# Patient Record
Sex: MALE | Race: ASIAN | NOT HISPANIC OR LATINO | URBAN - METROPOLITAN AREA
[De-identification: names, ages, dates, MRNs, and addresses within clinical notes are randomized per-mention and may not be internally consistent; named-entity substitution may affect disease eponyms.]

---

## 2023-04-18 ENCOUNTER — INPATIENT (INPATIENT)
Facility: HOSPITAL | Age: 63
LOS: 1 days | Discharge: HOME | DRG: 101 | End: 2023-04-20
Attending: PSYCHIATRY & NEUROLOGY | Admitting: PSYCHIATRY & NEUROLOGY
Payer: MEDICARE

## 2023-04-18 VITALS
SYSTOLIC BLOOD PRESSURE: 124 MMHG | RESPIRATION RATE: 18 BRPM | OXYGEN SATURATION: 98 % | DIASTOLIC BLOOD PRESSURE: 79 MMHG | TEMPERATURE: 98 F | HEART RATE: 87 BPM

## 2023-04-18 DIAGNOSIS — R41.82 ALTERED MENTAL STATUS, UNSPECIFIED: ICD-10-CM

## 2023-04-18 LAB
ALBUMIN SERPL ELPH-MCNC: 4.6 G/DL — SIGNIFICANT CHANGE UP (ref 3.3–5)
ALP SERPL-CCNC: 85 U/L — SIGNIFICANT CHANGE UP (ref 40–120)
ALT FLD-CCNC: 25 U/L — SIGNIFICANT CHANGE UP (ref 10–45)
ANION GAP SERPL CALC-SCNC: 13 MMOL/L — SIGNIFICANT CHANGE UP (ref 5–17)
AST SERPL-CCNC: 37 U/L — SIGNIFICANT CHANGE UP (ref 10–40)
BASOPHILS # BLD AUTO: 0.02 K/UL — SIGNIFICANT CHANGE UP (ref 0–0.2)
BASOPHILS NFR BLD AUTO: 0.3 % — SIGNIFICANT CHANGE UP (ref 0–2)
BILIRUB SERPL-MCNC: 0.4 MG/DL — SIGNIFICANT CHANGE UP (ref 0.2–1.2)
BUN SERPL-MCNC: 10 MG/DL — SIGNIFICANT CHANGE UP (ref 7–23)
CALCIUM SERPL-MCNC: 9.5 MG/DL — SIGNIFICANT CHANGE UP (ref 8.4–10.5)
CHLORIDE SERPL-SCNC: 102 MMOL/L — SIGNIFICANT CHANGE UP (ref 96–108)
CK SERPL-CCNC: 173 U/L — SIGNIFICANT CHANGE UP (ref 30–200)
CO2 SERPL-SCNC: 24 MMOL/L — SIGNIFICANT CHANGE UP (ref 22–31)
CREAT SERPL-MCNC: 0.88 MG/DL — SIGNIFICANT CHANGE UP (ref 0.5–1.3)
EGFR: 97 ML/MIN/1.73M2 — SIGNIFICANT CHANGE UP
EOSINOPHIL # BLD AUTO: 0.11 K/UL — SIGNIFICANT CHANGE UP (ref 0–0.5)
EOSINOPHIL NFR BLD AUTO: 1.7 % — SIGNIFICANT CHANGE UP (ref 0–6)
GLUCOSE SERPL-MCNC: 95 MG/DL — SIGNIFICANT CHANGE UP (ref 70–99)
HCT VFR BLD CALC: 40.6 % — SIGNIFICANT CHANGE UP (ref 39–50)
HGB BLD-MCNC: 14 G/DL — SIGNIFICANT CHANGE UP (ref 13–17)
IMM GRANULOCYTES NFR BLD AUTO: 0.5 % — SIGNIFICANT CHANGE UP (ref 0–0.9)
LACTATE SERPL-SCNC: 1 MMOL/L — SIGNIFICANT CHANGE UP (ref 0.5–2)
LYMPHOCYTES # BLD AUTO: 1.71 K/UL — SIGNIFICANT CHANGE UP (ref 1–3.3)
LYMPHOCYTES # BLD AUTO: 27.1 % — SIGNIFICANT CHANGE UP (ref 13–44)
MCHC RBC-ENTMCNC: 30 PG — SIGNIFICANT CHANGE UP (ref 27–34)
MCHC RBC-ENTMCNC: 34.5 GM/DL — SIGNIFICANT CHANGE UP (ref 32–36)
MCV RBC AUTO: 87.1 FL — SIGNIFICANT CHANGE UP (ref 80–100)
MONOCYTES # BLD AUTO: 0.4 K/UL — SIGNIFICANT CHANGE UP (ref 0–0.9)
MONOCYTES NFR BLD AUTO: 6.3 % — SIGNIFICANT CHANGE UP (ref 2–14)
NEUTROPHILS # BLD AUTO: 4.05 K/UL — SIGNIFICANT CHANGE UP (ref 1.8–7.4)
NEUTROPHILS NFR BLD AUTO: 64.1 % — SIGNIFICANT CHANGE UP (ref 43–77)
NRBC # BLD: 0 /100 WBCS — SIGNIFICANT CHANGE UP (ref 0–0)
PLATELET # BLD AUTO: 372 K/UL — SIGNIFICANT CHANGE UP (ref 150–400)
POTASSIUM SERPL-MCNC: 4.9 MMOL/L — SIGNIFICANT CHANGE UP (ref 3.5–5.3)
POTASSIUM SERPL-SCNC: 4.9 MMOL/L — SIGNIFICANT CHANGE UP (ref 3.5–5.3)
PROLACTIN SERPL-MCNC: 8.1 NG/ML — SIGNIFICANT CHANGE UP (ref 4.1–18.4)
PROT SERPL-MCNC: 7.2 G/DL — SIGNIFICANT CHANGE UP (ref 6–8.3)
RBC # BLD: 4.66 M/UL — SIGNIFICANT CHANGE UP (ref 4.2–5.8)
RBC # FLD: 13.5 % — SIGNIFICANT CHANGE UP (ref 10.3–14.5)
SODIUM SERPL-SCNC: 139 MMOL/L — SIGNIFICANT CHANGE UP (ref 135–145)
WBC # BLD: 6.32 K/UL — SIGNIFICANT CHANGE UP (ref 3.8–10.5)
WBC # FLD AUTO: 6.32 K/UL — SIGNIFICANT CHANGE UP (ref 3.8–10.5)

## 2023-04-18 PROCEDURE — 99285 EMERGENCY DEPT VISIT HI MDM: CPT | Mod: FS

## 2023-04-18 PROCEDURE — 99223 1ST HOSP IP/OBS HIGH 75: CPT

## 2023-04-18 PROCEDURE — 95720 EEG PHY/QHP EA INCR W/VEEG: CPT

## 2023-04-18 PROCEDURE — G1004: CPT

## 2023-04-18 PROCEDURE — 70450 CT HEAD/BRAIN W/O DYE: CPT | Mod: 26,MG

## 2023-04-18 RX ORDER — MEMANTINE HYDROCHLORIDE 10 MG/1
1 TABLET ORAL
Refills: 0 | DISCHARGE

## 2023-04-18 RX ORDER — DONEPEZIL HYDROCHLORIDE 10 MG/1
10 TABLET, FILM COATED ORAL AT BEDTIME
Refills: 0 | Status: DISCONTINUED | OUTPATIENT
Start: 2023-04-18 | End: 2023-04-20

## 2023-04-18 RX ORDER — ENOXAPARIN SODIUM 100 MG/ML
40 INJECTION SUBCUTANEOUS EVERY 24 HOURS
Refills: 0 | Status: DISCONTINUED | OUTPATIENT
Start: 2023-04-18 | End: 2023-04-20

## 2023-04-18 RX ORDER — MEMANTINE HYDROCHLORIDE 10 MG/1
28 TABLET ORAL DAILY
Refills: 0 | Status: DISCONTINUED | OUTPATIENT
Start: 2023-04-18 | End: 2023-04-18

## 2023-04-18 RX ORDER — MEMANTINE HYDROCHLORIDE 10 MG/1
28 TABLET ORAL DAILY
Refills: 0 | Status: DISCONTINUED | OUTPATIENT
Start: 2023-04-18 | End: 2023-04-20

## 2023-04-18 RX ADMIN — ENOXAPARIN SODIUM 40 MILLIGRAM(S): 100 INJECTION SUBCUTANEOUS at 14:46

## 2023-04-18 RX ADMIN — DONEPEZIL HYDROCHLORIDE 10 MILLIGRAM(S): 10 TABLET, FILM COATED ORAL at 21:29

## 2023-04-18 RX ADMIN — MEMANTINE HYDROCHLORIDE 28 MILLIGRAM(S): 10 TABLET ORAL at 19:02

## 2023-04-18 NOTE — H&P ADULT - HISTORY OF PRESENT ILLNESS
MRN-[id number]  Patient is a 63y old  Male who presents with a chief complaint of   HPI:  Patient is a 62yo RT handed Rainer Speaking M with pmh of Dementia ( dx 2021, Follows Dr. Caren Garcia) and HLD presents to [facility] for episode of AMS. Patient's family member at bedside and provided history and video of the episode. Per family, this is the first time this has occurred. Patient was sitting on a high chair eating breakfast when the event occurred. Patient fell backwards and had a headstrike. Patient had CT head w/o contrast on admission which was negative for any acute abnormalities. Per video shown by family, patient had loud ictal cry with raising of his left arm followed by following backwards with Rt head turn with forced Rt gaze. Patient was noted to have foaming at the mouth. This event lasted for about 5 minutes followed by long period of postictal confusion. Family states at baseline, patient is AAOX3 and is currently AA0x2. Patient denies urinary and fecal incontinence.     Semiology   1. Ictal Cry with Left arm raise. Then  rt gaze deviation with rt head turn.  Followed by postictal confusion.     PAST MEDICAL & SURGICAL HISTORY:  Dementia      HLD (hyperlipidemia)        FAMILY HISTORY:    Social Hx:  Nonsmoker, no drug or alcohol use    Home Medications:    MEDICATIONS  (STANDING):    MEDICATIONS  (PRN):    Allergies  No Known Allergies    Intolerances      REVIEW OF SYSTEMS  General: Denies fever and chills 	  Ophthalmologic: Denies blurred vision   Respiratory and Thorax: Denies SOB	  Cardiovascular:	Denies CP  : Denies urinary incontinence and fecal incontinence   Gastrointestinal:	 Denies N, V  Musculoskeletal: Denies muscle pain   Neurological:	Mentions headaches  :	    ROS: Pertinent positives in HPI, all other ROS were reviewed and are negative.         MRN-[id number]  Patient is a 63y old  Male who presents with a chief complaint of   HPI:  Patient is a 62yo RT handed Rainer Speaking M with pmh of Early onset Alzheimer's Dementia ( dx 2021, Follows Dr. Caren Garcia) and HLD presents to [facility] for episode of AMS. Patient's family member at bedside and provided history and video of the episode. Per family, this is the first time this has occurred. Patient was sitting on a high chair eating breakfast when the event occurred. Patient fell backwards and had a headstrike. Patient had CT head w/o contrast on admission which was negative for any acute abnormalities. Per video shown by family, patient had loud ictal cry with raising of his left arm followed by following backwards with Rt head turn with forced Rt gaze. Patient was noted to have foaming at the mouth. This event lasted for about 5 minutes followed by long period of postictal confusion. Family states at baseline, patient is AAOX3 and is currently AA0x2. Patient denies urinary and fecal incontinence.   Spoke to his Neurologist, Dr. Garcia whom confirmed patient does not have a history of epilepsy and diagnosed with Early onset Alzheimer's Dementia confirmed with MRI and PET scan.  Patient takes Memantine 28mg QDay ER and Donzepil 10mg Qday,     Semiology   1. Ictal Cry with Left arm raise. Then  rt gaze deviation with rt head turn.  Followed by postictal confusion.     PAST MEDICAL & SURGICAL HISTORY:  Dementia      HLD (hyperlipidemia)        FAMILY HISTORY:    Social Hx:  Nonsmoker, no drug or alcohol use    Home Medications:    MEDICATIONS  (STANDING):    MEDICATIONS  (PRN):    Allergies  No Known Allergies    Intolerances      REVIEW OF SYSTEMS  General: Denies fever and chills 	  Ophthalmologic: Denies blurred vision   Respiratory and Thorax: Denies SOB	  Cardiovascular:	Denies CP  : Denies urinary incontinence and fecal incontinence   Gastrointestinal:	 Denies N, V  Musculoskeletal: Denies muscle pain   Neurological:	Mentions headaches  :	    ROS: Pertinent positives in HPI, all other ROS were reviewed and are negative.

## 2023-04-18 NOTE — ED PROVIDER NOTE - OBJECTIVE STATEMENT
64 yo male with PMHx of unspecified dementia (on memantine and donepezil) was brought in by family for witnessed episode of "frothing" at the mouth and confusion after falling from a high chair earlier in the morning. Pt's wife recorded the episode, stated that he was drinking something then gasped and slipped off the chair. The wife tried to ease him down but was unable to hold him and he had head strike. In the video, the pt was spewing liquid from mouth and was notably confused afterwards for 10-15mins. Currently A&Ox3 (self, hospital, year). Per family, denied hx of seizures or similar behaviors in the past. Denied fevers, chills.

## 2023-04-18 NOTE — ED PROVIDER NOTE - PHYSICAL EXAMINATION
CONSTITUTIONAL: Patient is awake, alert and oriented x 3. In no acute distress.  HEAD: NC/AT  EYES: PERRL b/l, EOMI, sclera non-icteric  NECK: supple, no LAD  LUNGS: CTAB, no increased WOB, no wheezing/rales appreciated  HEART: RRR.+S1S2, no murmurs appreciated  ABDOMEN: Soft, non-distended, non-tender, +BS, no rebound or guarding.   EXTREMITY: WWP, no edema or calf tenderness b/l, full ROM upper and lower ext b/l  SKIN: No rash or lesions appreciated  NEURO: Cn3-12 grossly intact. Strength 5/5 UE/LE. normal sensation

## 2023-04-18 NOTE — ED ADULT TRIAGE NOTE - CHIEF COMPLAINT QUOTE
Per daughter Pt fell this am was shaking with foam from mouth HX Dementia  Denies incontinence of urine Per daughter "almost back to normal mental self."  Hit head Poss LOC   Ambulate fine without walker per daughter

## 2023-04-18 NOTE — H&P ADULT - NSHPPHYSICALEXAM_GEN_ALL_CORE
Vital Signs Last 24 Hrs  T(C): 36.8 (18 Apr 2023 09:52), Max: 36.8 (18 Apr 2023 09:52)  T(F): 98.3 (18 Apr 2023 09:52), Max: 98.3 (18 Apr 2023 09:52)  HR: 85 (18 Apr 2023 10:29) (85 - 87)  BP: 130/83 (18 Apr 2023 10:29) (124/79 - 130/83)  BP(mean): --  RR: 18 (18 Apr 2023 10:29) (18 - 18)  SpO2: 100% (18 Apr 2023 10:29) (98% - 100%)    Parameters below as of 18 Apr 2023 10:29  Patient On (Oxygen Delivery Method): room air        GENERAL EXAM:  Constitutional: awake and alert. NAD  HEENT: PERRL, EOMI  Neck: Supple  Musculoskeletal: no joint swelling/tenderness, no abnormal movements  Skin: no rashes    NEUROLOGICAL EXAM:  MS: AAOX2 to person an place. Speech is fluent, Follows simple commands.   CN: VFF, EOMI, PERRL,    V1-3 intact, no facial asymmetry, t/p midline, SCM/trap intact.  Motor: Strength: 5/5 4x. Tone: normal. Bulk: normal.    Plantar flex b/l.   Sensation: intact 4x.   Coordination: FTN b/l.   Gait:  Romberg negative, pull test negative; walks with narrow base, pivots in 2 steps.

## 2023-04-18 NOTE — H&P ADULT - ASSESSMENT
Patient is a 62yo RT handed Rainer Speaking M with pmh of Dementia ( dx 2021, Follows Dr. Caren Garcia) and HLD presents to [facility] for episode of AMS.  Patient was sitting on a high chair eating breakfast when the event occurred. Patient fell backwards and had a headstrike. Patient had CT head w/o contrast on admission which was negative for any acute abnormalities.    Semiology:   Semiology   1. Ictal Cry with Left arm raise. Then  rt gaze deviation with rt head turn.  Followed by postictal confusion.     Recommendations;   Neurology:  EMU admission   vEEG  Continue on home Neurology medications   hold of on AEDS for now   Rescue: Ativan 1mg for GTCs with VS changes lasting longer than 3 minutes, If refractory then Vimpat 100mg X1 IV   Seizure precautions     Pulm   on RA     Cardiology   Keep normotensive   Telemetry   Baseline EKG     GI   bedside dysphagia screen   BM regiment     Heme  monitor CBC   DVT ppx     Renal   monitor BMP  Correction of electrolytes as needed   NS 50 cc/hr until tolerating diet     Endo   no acute issues     ID   Afebrile  U/A     Case discussed with Epilepsy Attending, Dr. Levi.  Patient is a 64yo RT handed Rainer Speaking M with pmh of Dementia ( dx 2021, Follows Dr. Caren Garcia) and HLD presents to [facility] for episode of AMS.  Patient was sitting on a high chair eating breakfast when the event occurred. Patient fell backwards and had a headstrike. Patient had CT head w/o contrast on admission which was negative for any acute abnormalities.    Semiology:   Semiology   1. Ictal Cry with Left arm raise. Then  rt gaze deviation with rt head turn.  Followed by postictal confusion.     Recommendations;   Neurology:  EMU admission   vEEG  Continue on Memantine 28mg Qday ER   Continue on Donepezil 10mg Qday    hold of on AEDS for now   Rescue: Ativan 1mg for GTCs with VS changes lasting longer than 3 minutes, If refractory then Vimpat 100mg X1 IV   Seizure precautions     Pulm   on RA     Cardiology   Keep normotensive   Telemetry   Baseline EKG     GI   bedside dysphagia screen   BM regiment     Heme  monitor CBC   DVT ppx     Renal   monitor BMP  Correction of electrolytes as needed   NS 50 cc/hr until tolerating diet     Endo   no acute issues     ID   Afebrile  U/A     Case discussed with Epilepsy Attending, Dr. Levi.

## 2023-04-18 NOTE — ED PROVIDER NOTE - CLINICAL SUMMARY MEDICAL DECISION MAKING FREE TEXT BOX
64 yo male with PMHx of unspecified dementia (on memantine and donepezil) was brought in by family for witnessed episode of "frothing" at the mouth and confusion after falling from a high chair earlier in the morning. Pt's wife recorded the episode, stated that he was drinking something then gasped and slipped off the chair. The wife tried to ease him down but was unable to hold him and he had head strike. In the video, the pt was spewing liquid from mouth and was notably confused afterwards for 10-15mins. Vitals within normal limits, exam relatively unremarkable - A&Ox3 - per family back to baseline, CN3-12 grossly intact, no weakness appreciated. Strange presentation, pt initially appeared to be choking on liquids - therefore was spewing fluids out. No muscular twitching appreciated on the video. Per family, pt was confused for a little and then returned to baseline. Has never had this happen before. Presentation c/f possible seizures with post-ictal confusion - although no prior hx of seizures. Will get common labs and NCHCT to r/o electrolyte mediated seizures vs possibly drug induced vs malignancy. Will consult neuro for further evaluation. 62 yo male with PMHx of unspecified dementia (on memantine and donepezil) was brought in by family for witnessed episode of "frothing" at the mouth and confusion after falling from a high chair earlier in the morning. Pt's wife recorded the episode, stated that he was drinking something then gasped and slipped off the chair. The wife tried to ease him down but was unable to hold him and he had head strike. In the video, the pt was spewing liquid from mouth and was notably confused afterwards for 10-15mins. Vitals within normal limits, exam relatively unremarkable - A&Ox3 - per family back to baseline, CN3-12 grossly intact, no weakness appreciated. Strange presentation, pt initially appeared to be choking on liquids - therefore was spewing fluids out. No muscular twitching appreciated on the video. Per family, pt was confused for a little and then returned to baseline. Has never had this happen before. Presentation c/f possible seizures with post-ictal confusion - although no prior hx of seizures. Will get common labs and NCHCT to r/o electrolyte mediated seizures vs possibly drug induced vs malignancy. Will consult neuro for further evaluation.ZR

## 2023-04-18 NOTE — H&P ADULT - NSHPLABSRESULTS_GEN_ALL_CORE
Labs:   cbc                      14.0   6.32  )-----------( 372      ( 18 Apr 2023 10:44 )             40.6     Chem      Radiology:  CT head w/o contrast: IMPRESSION: No acute hemorrhage mass or mass effect.

## 2023-04-18 NOTE — ED PROVIDER NOTE - PROGRESS NOTE DETAILS
Neuro consulted, will see pt.   - Bethel Caro PA-C Neuro recs for admission to EMU. Pt and family was made aware.   - Bethel Caro PA-C

## 2023-04-18 NOTE — ED ADULT NURSE NOTE - OBJECTIVE STATEMENT
63M awake and alert oriented to self and family members only, knows he is in the hospital but doesn't know which one, came from home accomanied by wife with c/o seizure like activity while drinking his milk and wife ease the patient in the floor and took video of what's happening with the patient. iN THE VIDEO PATIENT LOOKS LIKE HAVING A GENERALIZED SEIZURE ACTIVITY WITH MILK COMING OUT OF THE MOUTH, NOT SURE IF PATIENT ASPIRATED. wIFE DENIES THAT PATIENT FELL DIRECTLY IN THE FLOOR. No signs of injury, patient at his baseline as per wife. VS WDL. Patient with h/o dementia.   Left AC 18g IV access inserted, labs drawn pending order from MD.

## 2023-04-19 ENCOUNTER — TRANSCRIPTION ENCOUNTER (OUTPATIENT)
Age: 63
End: 2023-04-19

## 2023-04-19 LAB
ALBUMIN SERPL ELPH-MCNC: 4.1 G/DL — SIGNIFICANT CHANGE UP (ref 3.3–5)
ALP SERPL-CCNC: 92 U/L — SIGNIFICANT CHANGE UP (ref 40–120)
ALT FLD-CCNC: 21 U/L — SIGNIFICANT CHANGE UP (ref 10–45)
ANION GAP SERPL CALC-SCNC: 12 MMOL/L — SIGNIFICANT CHANGE UP (ref 5–17)
AST SERPL-CCNC: 29 U/L — SIGNIFICANT CHANGE UP (ref 10–40)
BASOPHILS # BLD AUTO: 0.05 K/UL — SIGNIFICANT CHANGE UP (ref 0–0.2)
BASOPHILS NFR BLD AUTO: 0.7 % — SIGNIFICANT CHANGE UP (ref 0–2)
BILIRUB SERPL-MCNC: 0.3 MG/DL — SIGNIFICANT CHANGE UP (ref 0.2–1.2)
BUN SERPL-MCNC: 8 MG/DL — SIGNIFICANT CHANGE UP (ref 7–23)
CALCIUM SERPL-MCNC: 9.2 MG/DL — SIGNIFICANT CHANGE UP (ref 8.4–10.5)
CHLORIDE SERPL-SCNC: 104 MMOL/L — SIGNIFICANT CHANGE UP (ref 96–108)
CO2 SERPL-SCNC: 22 MMOL/L — SIGNIFICANT CHANGE UP (ref 22–31)
CREAT SERPL-MCNC: 0.81 MG/DL — SIGNIFICANT CHANGE UP (ref 0.5–1.3)
EGFR: 99 ML/MIN/1.73M2 — SIGNIFICANT CHANGE UP
EOSINOPHIL # BLD AUTO: 0.24 K/UL — SIGNIFICANT CHANGE UP (ref 0–0.5)
EOSINOPHIL NFR BLD AUTO: 3.3 % — SIGNIFICANT CHANGE UP (ref 0–6)
GLUCOSE SERPL-MCNC: 92 MG/DL — SIGNIFICANT CHANGE UP (ref 70–99)
HCT VFR BLD CALC: 39 % — SIGNIFICANT CHANGE UP (ref 39–50)
HCV AB S/CO SERPL IA: 0.58 S/CO — SIGNIFICANT CHANGE UP (ref 0–0.99)
HCV AB SERPL-IMP: SIGNIFICANT CHANGE UP
HGB BLD-MCNC: 13.3 G/DL — SIGNIFICANT CHANGE UP (ref 13–17)
IMM GRANULOCYTES NFR BLD AUTO: 0.4 % — SIGNIFICANT CHANGE UP (ref 0–0.9)
LYMPHOCYTES # BLD AUTO: 2.76 K/UL — SIGNIFICANT CHANGE UP (ref 1–3.3)
LYMPHOCYTES # BLD AUTO: 37.8 % — SIGNIFICANT CHANGE UP (ref 13–44)
MCHC RBC-ENTMCNC: 29.8 PG — SIGNIFICANT CHANGE UP (ref 27–34)
MCHC RBC-ENTMCNC: 34.1 GM/DL — SIGNIFICANT CHANGE UP (ref 32–36)
MCV RBC AUTO: 87.4 FL — SIGNIFICANT CHANGE UP (ref 80–100)
MONOCYTES # BLD AUTO: 0.62 K/UL — SIGNIFICANT CHANGE UP (ref 0–0.9)
MONOCYTES NFR BLD AUTO: 8.5 % — SIGNIFICANT CHANGE UP (ref 2–14)
NEUTROPHILS # BLD AUTO: 3.61 K/UL — SIGNIFICANT CHANGE UP (ref 1.8–7.4)
NEUTROPHILS NFR BLD AUTO: 49.3 % — SIGNIFICANT CHANGE UP (ref 43–77)
NRBC # BLD: 0 /100 WBCS — SIGNIFICANT CHANGE UP (ref 0–0)
PLATELET # BLD AUTO: 340 K/UL — SIGNIFICANT CHANGE UP (ref 150–400)
POTASSIUM SERPL-MCNC: 4 MMOL/L — SIGNIFICANT CHANGE UP (ref 3.5–5.3)
POTASSIUM SERPL-SCNC: 4 MMOL/L — SIGNIFICANT CHANGE UP (ref 3.5–5.3)
PROT SERPL-MCNC: 6.5 G/DL — SIGNIFICANT CHANGE UP (ref 6–8.3)
RBC # BLD: 4.46 M/UL — SIGNIFICANT CHANGE UP (ref 4.2–5.8)
RBC # FLD: 13.3 % — SIGNIFICANT CHANGE UP (ref 10.3–14.5)
SODIUM SERPL-SCNC: 138 MMOL/L — SIGNIFICANT CHANGE UP (ref 135–145)
WBC # BLD: 7.31 K/UL — SIGNIFICANT CHANGE UP (ref 3.8–10.5)
WBC # FLD AUTO: 7.31 K/UL — SIGNIFICANT CHANGE UP (ref 3.8–10.5)

## 2023-04-19 PROCEDURE — 95720 EEG PHY/QHP EA INCR W/VEEG: CPT

## 2023-04-19 PROCEDURE — 99232 SBSQ HOSP IP/OBS MODERATE 35: CPT

## 2023-04-19 RX ORDER — DONEPEZIL HYDROCHLORIDE 10 MG/1
1 TABLET, FILM COATED ORAL
Qty: 0 | Refills: 0 | DISCHARGE
Start: 2023-04-19

## 2023-04-19 RX ORDER — LEVETIRACETAM 250 MG/1
1 TABLET, FILM COATED ORAL
Qty: 60 | Refills: 0
Start: 2023-04-19 | End: 2023-05-18

## 2023-04-19 RX ORDER — DONEPEZIL HYDROCHLORIDE 10 MG/1
1 TABLET, FILM COATED ORAL
Refills: 0 | DISCHARGE

## 2023-04-19 RX ORDER — ZONISAMIDE 100 MG
100 CAPSULE ORAL AT BEDTIME
Refills: 0 | Status: DISCONTINUED | OUTPATIENT
Start: 2023-04-19 | End: 2023-04-19

## 2023-04-19 RX ORDER — LEVETIRACETAM 250 MG/1
500 TABLET, FILM COATED ORAL
Refills: 0 | Status: DISCONTINUED | OUTPATIENT
Start: 2023-04-19 | End: 2023-04-20

## 2023-04-19 RX ORDER — LEVETIRACETAM 250 MG/1
1000 TABLET, FILM COATED ORAL ONCE
Refills: 0 | Status: COMPLETED | OUTPATIENT
Start: 2023-04-19 | End: 2023-04-19

## 2023-04-19 RX ADMIN — DONEPEZIL HYDROCHLORIDE 10 MILLIGRAM(S): 10 TABLET, FILM COATED ORAL at 21:27

## 2023-04-19 RX ADMIN — ENOXAPARIN SODIUM 40 MILLIGRAM(S): 100 INJECTION SUBCUTANEOUS at 11:58

## 2023-04-19 RX ADMIN — MEMANTINE HYDROCHLORIDE 28 MILLIGRAM(S): 10 TABLET ORAL at 11:59

## 2023-04-19 RX ADMIN — LEVETIRACETAM 400 MILLIGRAM(S): 250 TABLET, FILM COATED ORAL at 10:20

## 2023-04-19 RX ADMIN — LEVETIRACETAM 500 MILLIGRAM(S): 250 TABLET, FILM COATED ORAL at 17:05

## 2023-04-19 NOTE — DISCHARGE NOTE PROVIDER - NSDCCPCAREPLAN_GEN_ALL_CORE_FT
PRINCIPAL DISCHARGE DIAGNOSIS  Diagnosis: Seizure disorder  Assessment and Plan of Treatment: You were monitored on vEEG and are at high risk for seizures. You were started on keppra 500mg two times a day. Please take your medications as prescribed. Please follow up with your neurologist. Please return to the ED for any seizure like activity.     PRINCIPAL DISCHARGE DIAGNOSIS  Diagnosis: Seizure disorder  Assessment and Plan of Treatment: You were monitored on vEEG and are at high risk for seizures. You were started on keppra 500mg two times a day. Please take your medications as prescribed. Please follow up with your neurologist. Please return to the ED for any seizure like activity.  Seizure Safety Instructions  1. [facility] law mandates you to self-report your seizure disorder to [facility], and be seizure-free for 1yr before you can drive.   2. Avoid swimming, diving, taking a bath, cooking, use of motarized machineries.  3. Avoid climbing a ladder, trees or any height.  4. Use machines with safety switches.  5. Always be aware of your surroundings and make sure family and friends are aware of your seizures.  6. Use non-breakable dishes.  7. Consider wearing a medical bracelet to inform people you have epilepsy in case of an emergency.  To get better and follow your care plan as instructed.

## 2023-04-19 NOTE — PROGRESS NOTE ADULT - ASSESSMENT
Patient is a 64yo RT handed Rainer Speaking M with pmh of Dementia ( dx 2021, Follows Dr. Caren Garcia) and HLD presents to [facility] for episode of AMS.  Patient was sitting on a high chair eating breakfast when the event occurred. Patient fell backwards and had a headstrike. Patient had CT head w/o contrast on admission which was negative for any acute abnormalities.    Semiology   1. Ictal Cry with Left arm raise. Then  rt gaze deviation with rt head turn.  Followed by postictal confusion.     PLAN:   vEEG  Continue on Memantine 28mg Qday ER   Continue on Donepezil 10mg Qday    hold of on AEDS for now   Rescue: Ativan 1mg for GTCs with VS changes lasting longer than 3 minutes, If refractory then Vimpat 100mg X1 IV   Seizure precautions   Telemetry   DVT ppx - lovenox subq   diet: regular     Case discussed with Epilepsy Attending, Dr. Levi.  Patient is a 62yo RT handed Rainer Speaking M with pmh of Dementia ( dx 2021, Follows Dr. Caren Garcia) and HLD presents to [facility] for episode of AMS.  Patient was sitting on a high chair eating breakfast when the event occurred. Patient fell backwards and had a headstrike. Patient had CT head w/o contrast on admission which was negative for any acute abnormalities.    Semiology   1. Ictal Cry with Left arm raise. Then  rt gaze deviation with rt head turn.  Followed by postictal confusion.     PLAN:   - vEEG  - load with keppra 1g IVPB on 4/19 and start maintenance keppra 500mg BID  - Continue on Memantine 28mg Qday ER   - Continue on Donepezil 10mg Qday    - Plan of care discussed with lisa at bedside, patient, and patient's wife   Rescue: Ativan 1mg for GTCs with VS changes lasting longer than 3 minutes, If refractory then Vimpat 100mg X1 IV   Seizure precautions   Telemetry   DVT ppx - lovenox subq   diet: regular     Case discussed with Epilepsy Attending, Dr. Levi.

## 2023-04-19 NOTE — DISCHARGE NOTE PROVIDER - NSDCMRMEDTOKEN_GEN_ALL_CORE_FT
donepezil 10 mg oral tablet: 1 tab(s) orally once a day (at bedtime)  levETIRAcetam 500 mg oral tablet: 1 tab(s) orally 2 times a day MDD: 1000mg  memantine 28 mg oral capsule, extended release: 1 orally once a day

## 2023-04-19 NOTE — EEG REPORT - NS EEG TEXT BOX
Day 1 – 	Start: 4/18/2023 5:41 PM     	End: 4/19/2023 08:00 AM   	Duration:  14 hr 19 min     Daily EEG Visual Analysis    FINDINGS:  The background was continuous, symmetric, spontaneously variable and reactive. During wakefulness, the posterior dominant rhythm consisted of symmetric, 7-8 Hz activity, with amplitude to 30 uV, that attenuated to eye opening.  Low amplitude frontal beta was noted in wakefulness. Anterior to posterior gradient was present.     Background Slowing:  Diffuse theta and delta activity was present with shifting maxima    Focal Slowing:   Intermittent polymorphic/quasiperiodic delta slowing in the left temporal region, seen during sleep state.     Sleep Background:  Drowsiness was characterized by fragmentation, attenuation, and slowing of the background activity.    Sleep was characterized by the presence of vertex waves, symmetric sleep spindles and K-complexes.    Other Non-Epileptiform Findings:  None were present.    Activation Procedures:   Hyperventilation was not performed.    Photic stimulation was not performed.    Interictal Epileptiform Activity:   Abundant generalized spike waves often fragmented maximal in the frontal midline at times fielding into right frontal regions.        Events:  No events or seizures recorded.    Artifacts:  Intermittent myogenic and movement artifacts were noted.    ECG:  The heart rate on single channel ECG was predominantly between 60-90 BPM.    ASMs: None     EEG Summary:    Abnormal EEG in the awake, drowsy and asleep states.    1.	Abundant generalized spike waves often fragmented maximal in the frontal midline at times fielding into right frontal regions.   2.	Background slowing, generalized, mild to moderate       Impression/Clinical Correlate:    This is an abnormal EEG record.     1.	Generalized spike waves supports a generalized epilepsy diathesis however the frequent fragmented spike population and the frontal midline predominance may also be suggestive of a focal epilepsy with rapid bilateral propagation. Clinical discretion recommended.   2.	Mild to moderate diffuse / multifocal cerebral dysfunction.   3.	No seizures were recorded.     Day 1 – 	Start: 4/18/2023 5:41 PM     	End: 4/19/2023 08:00 AM   	Duration:  14 hr 19 min     Daily EEG Visual Analysis    FINDINGS:  The background was continuous, symmetric, spontaneously variable and reactive. During wakefulness, the posterior dominant rhythm consisted of symmetric, 7-8 Hz activity, with amplitude to 30 uV, that attenuated to eye opening.  Low amplitude frontal beta was noted in wakefulness. Anterior to posterior gradient was present.     Background Slowing:  Diffuse theta and delta activity was present with shifting maxima    Focal Slowing:   Intermittent polymorphic/quasiperiodic delta slowing in the left temporal region, seen during sleep state.     Sleep Background:  Drowsiness was characterized by fragmentation, attenuation, and slowing of the background activity.    Sleep was characterized by the presence of vertex waves, symmetric sleep spindles and K-complexes.    Other Non-Epileptiform Findings:  None were present.    Activation Procedures:   Hyperventilation was not performed.    Photic stimulation was not performed.    Interictal Epileptiform Activity:   Abundant generalized spike & spike wave complexes (<1 sec) often fragmented maximal in the frontal midline     Events:  No events or seizures recorded.    Artifacts:  Intermittent myogenic and movement artifacts were noted.    ECG:  The heart rate on single channel ECG was predominantly between 60-90 BPM.    ASMs: None     EEG Summary:    Abnormal EEG in the awake, drowsy and asleep states.    1.	Abundant generalized spike & spike wave complexes (<1 sec) often fragmented maximal in the frontal midline   2.	Background slowing, generalized, mild to moderate       Impression/Clinical Correlate:    This is an abnormal EEG record.     1.	Generalized spike & spike-wave complexes support a generalized epilepsy diathesis  2.	Mild to moderate diffuse / multifocal cerebral dysfunction.   3.	No seizures were recorded.     Day 1 – 	Start: 4/18/2023 5:41 PM     	End: 4/19/2023 08:00 AM   	Duration:  14 hr 19 min     Daily EEG Visual Analysis    FINDINGS:  The background was continuous, symmetric, spontaneously variable and reactive. During wakefulness, the posterior dominant rhythm consisted of symmetric, 7-8 Hz activity, with amplitude to 30 uV, that attenuated to eye opening.  Low amplitude frontal beta was noted in wakefulness. Anterior to posterior gradient was present.     Background Slowing:  Diffuse theta and delta activity was present with shifting maxima    Focal Slowing:   Intermittent polymorphic/quasiperiodic delta slowing in the left temporal region, seen during sleep state.     Sleep Background:  Drowsiness was characterized by fragmentation, attenuation, and slowing of the background activity.    Sleep was characterized by the presence of vertex waves, symmetric sleep spindles and K-complexes.    Other Non-Epileptiform Findings:  None were present.    Activation Procedures:   Hyperventilation was not performed.    Photic stimulation was not performed.    Interictal Epileptiform Activity:   Abundant generalized spike & spike wave complexes (<1 sec) often fragmented maximal in the frontal midline     Events:  No events or seizures recorded.    Artifacts:  Intermittent myogenic and movement artifacts were noted.    ECG:  The heart rate on single channel ECG was predominantly between 60-90 BPM.    ASMs: None     EEG Summary:    Abnormal EEG in the awake, drowsy and asleep states.    1.	Abundant generalized spike & spike wave complexes (<1 sec) often fragmented maximal in the frontal midline   2.	Background slowing, generalized, mild to moderate       Impression/Clinical Correlate:    This is an abnormal EEG record.     1.	Generalized spike & spike-wave complexes indicate increased risk of seizures with generalized onset.  2.	Mild to moderate diffuse / multifocal cerebral dysfunction.   3.	No seizures were recorded.    Rk Holder MD  Neurology Attending Physician

## 2023-04-19 NOTE — DISCHARGE NOTE PROVIDER - HOSPITAL COURSE
Patient is a 64yo RT handed Rainer Speaking M with pmh of Early onset Alzheimer's Dementia ( dx 2021, Follows Dr. Caren Garcia) and HLD presents to [facility] for episode of AMS. Patient's family member at bedside and provided history and video of the episode. Per family, this is the first time this has occurred. Patient was sitting on a high chair eating breakfast when the event occurred. Patient fell backwards and had a headstrike. Patient had CT head w/o contrast on admission which was negative for any acute abnormalities. Per video shown by family, patient had loud ictal cry with raising of his left arm followed by following backwards with Rt head turn with forced Rt gaze. Patient was noted to have foaming at the mouth. This event lasted for about 5 minutes followed by long period of postictal confusion. Family states at baseline, patient is AAOX3 and is currently AA0x2. Patient denies urinary and fecal incontinence.   Spoke to his Neurologist, Dr. Garcia whom confirmed patient does not have a history of epilepsy and diagnosed with Early onset Alzheimer's Dementia confirmed with MRI and PET scan.  Patient takes Memantine 28mg QDay ER and Donzepil 10mg Qday,   Semiology   1. Ictal Cry with Left arm raise. Then  rt gaze deviation with rt head turn.  Followed by postictal confusion.     Hospital course: Patient was monitored on vEEG. He was loaded with keppra 1g and started on keppra 500mg BID for seizures.       CT Head No Cont (04.18.23  No acute hemorrhage mass or mass effect.    EEG report 4/19/2023:  EEG Summary:  Abnormal EEG in the awake, drowsy and asleep states.  1.	Abundant generalized spike & spike wave complexes (<1 sec) often fragmented maximal in the frontal midline   2.	Background slowing, generalized, mild to moderate     Impression/Clinical Correlate:  This is an abnormal EEG record.   1.	Generalized spike & spike-wave complexes support a generalized epilepsy diathesis  2.	Mild to moderate diffuse / multifocal cerebral dysfunction.   3.	No seizures were recorded.    Patient is medically stable for discharge to home. Patient is a 64yo RT handed Rainer Speaking M with pmh of Early onset Alzheimer's Dementia ( dx 2021, Follows Dr. Caren Garcia) and HLD presents to [facility] for episode of AMS. Patient's family member at bedside and provided history and video of the episode. Per family, this is the first time this has occurred. Patient was sitting on a high chair eating breakfast when the event occurred. Patient fell backwards and had a headstrike. Patient had CT head w/o contrast on admission which was negative for any acute abnormalities. Per video shown by family, patient had loud ictal cry with raising of his left arm followed by following backwards with Rt head turn with forced Rt gaze. Patient was noted to have foaming at the mouth. This event lasted for about 5 minutes followed by long period of postictal confusion. Family states at baseline, patient is AAOX3 and is currently AA0x2. Patient denies urinary and fecal incontinence.   Spoke to his Neurologist, Dr. Garcia whom confirmed patient does not have a history of epilepsy and diagnosed with Early onset Alzheimer's Dementia confirmed with MRI and PET scan.  Patient takes Memantine 28mg QDay ER and Donzepil 10mg Qday,   Semiology   1. Ictal Cry with Left arm raise. Then  rt gaze deviation with rt head turn.  Followed by postictal confusion.     Hospital course: Patient was monitored on vEEG. He was loaded with keppra 1g and started on keppra 500mg BID for seizures. Patient's EEG improved and patient tolerated his medications well. Patient was deemed neurologically stable for discharge. Patient was advised to follow up with outpatient epilepsy neurology.     CT Head No Cont (04.18.23  No acute hemorrhage mass or mass effect.    EEG report 4/19/2023:  EEG Summary:  Abnormal EEG in the awake, drowsy and asleep states.  1.	Abundant generalized spike & spike wave complexes (<1 sec) often fragmented maximal in the frontal midline   2.	Background slowing, generalized, mild to moderate     Impression/Clinical Correlate:  This is an abnormal EEG record.   1.	Generalized spike & spike-wave complexes support a generalized epilepsy diathesis  2.	Mild to moderate diffuse / multifocal cerebral dysfunction.   3.	No seizures were recorded.

## 2023-04-19 NOTE — DISCHARGE NOTE PROVIDER - CARE PROVIDER_API CALL
Reina Levi)  EEGEpilepsy; Neurology  [address], [address]  [place] [address]  Phone: [phone]  Fax: [phone]  Follow Up Time: 1 month

## 2023-04-19 NOTE — PROGRESS NOTE ADULT - SUBJECTIVE AND OBJECTIVE BOX
THE PATIENT WAS SEEN AND EXAMINED BY ME WITH THE HOUSESTAFF DURING MORNING ROUNDS.   HPI:  Patient is a 62yo RT handed Rainer Speaking M with pmh of Early onset Alzheimer's Dementia ( dx 2021, Follows Dr. Caren Garcia) and HLD presents to [facility] for episode of AMS. Patient's family member at bedside and provided history and video of the episode. Per family, this is the first time this has occurred. Patient was sitting on a high chair eating breakfast when the event occurred. Patient fell backwards and had a headstrike. Patient had CT head w/o contrast on admission which was negative for any acute abnormalities. Per video shown by family, patient had loud ictal cry with raising of his left arm followed by following backwards with Rt head turn with forced Rt gaze. Patient was noted to have foaming at the mouth. This event lasted for about 5 minutes followed by long period of postictal confusion. Family states at baseline, patient is AAOX3 and is currently AA0x2. Patient denies urinary and fecal incontinence.   Spoke to his Neurologist, Dr. Garcia whom confirmed patient does not have a history of epilepsy and diagnosed with Early onset Alzheimer's Dementia confirmed with MRI and PET scan.  Patient takes Memantine 28mg QDay ER and Donzepil 10mg Qday,   Semiology   1. Ictal Cry with Left arm raise. Then  rt gaze deviation with rt head turn.  Followed by postictal confusion.       ROS: Otherwise negative.     SUBJECTIVE: No events overnight.  No new neurologic complaints.      donepezil 10 milliGRAM(s) Oral at bedtime  enoxaparin Injectable 40 milliGRAM(s) SubCutaneous every 24 hours  LORazepam   Injectable 1 milliGRAM(s) IV Push once PRN  memantine ER 28 milliGRAM(s) Oral daily      Physical Exam: Neurological Exam:  	Mental Status: Orientated to self, date and place.  Attention intact.  No dysarthria, aphasia or neglect.  	Cranial Nerves: PERRL, EOMI, no nystagmus or diplopia. No facial asymmetry.    	Motor: moving all 4 extremities equally w 5/5 strength  	Tone: normal.                  	Pronator drift: none                 	Dysmetria: None to finger-nose-finger  	No truncal ataxia.    	Tremor: No resting, postural or action tremor.  No myoclonus.  	Sensation: intact to light touch    LABS:                        13.3   7.31  )-----------( 340      ( 19 Apr 2023 06:21 )             39.0    04-19    138  |  104  |  8   ----------------------------<  92  4.0   |  22  |  0.81    Ca    9.2      19 Apr 2023 06:21    TPro  6.5  /  Alb  4.1  /  TBili  0.3  /  DBili  x   /  AST  29  /  ALT  21  /  AlkPhos  92  04-19           IMAGING:     CT Head No Cont (04.18.23  No acute hemorrhage mass or mass effect.     THE PATIENT WAS SEEN AND EXAMINED BY ME WITH THE HOUSESTAFF DURING MORNING ROUNDS.   HPI:  Patient is a 64yo RT handed Rainer Speaking M with pmh of Early onset Alzheimer's Dementia ( dx 2021, Follows Dr. Caren Garcia) and HLD presents to [facility] for episode of AMS. Patient's family member at bedside and provided history and video of the episode. Per family, this is the first time this has occurred. Patient was sitting on a high chair eating breakfast when the event occurred. Patient fell backwards and had a headstrike. Patient had CT head w/o contrast on admission which was negative for any acute abnormalities. Per video shown by family, patient had loud ictal cry with raising of his left arm followed by following backwards with Rt head turn with forced Rt gaze. Patient was noted to have foaming at the mouth. This event lasted for about 5 minutes followed by long period of postictal confusion. Family states at baseline, patient is AAOX3 and is currently AA0x2. Patient denies urinary and fecal incontinence.   Spoke to his Neurologist, Dr. Garcia whom confirmed patient does not have a history of epilepsy and diagnosed with Early onset Alzheimer's Dementia confirmed with MRI and PET scan.  Patient takes Memantine 28mg QDay ER and Donzepil 10mg Qday,   Semiology   1. Ictal Cry with Left arm raise. Then  rt gaze deviation with rt head turn.  Followed by postictal confusion.     ROS: Otherwise negative.     SUBJECTIVE: No events overnight.  No new neurologic complaints.      donepezil 10 milliGRAM(s) Oral at bedtime  enoxaparin Injectable 40 milliGRAM(s) SubCutaneous every 24 hours  LORazepam   Injectable 1 milliGRAM(s) IV Push once PRN  memantine ER 28 milliGRAM(s) Oral daily      Physical Exam: Neurological Exam:  	Mental Status: Orientated to self, date and place.  Attention intact.  No dysarthria, aphasia or neglect.  	Cranial Nerves: PERRL, EOMI, no nystagmus or diplopia. No facial asymmetry.    	Motor: moving all 4 extremities equally w 5/5 strength  	Tone: normal.                  	Pronator drift: none                 	Dysmetria: None to finger-nose-finger  	No truncal ataxia.    	Tremor: No resting, postural or action tremor.  No myoclonus.  	Sensation: intact to light touch    LABS:                        13.3   7.31  )-----------( 340      ( 19 Apr 2023 06:21 )             39.0    04-19    138  |  104  |  8   ----------------------------<  92  4.0   |  22  |  0.81    Ca    9.2      19 Apr 2023 06:21    TPro  6.5  /  Alb  4.1  /  TBili  0.3  /  DBili  x   /  AST  29  /  ALT  21  /  AlkPhos  92  04-19        IMAGING/ EEG:    CT Head No Cont (04.18.23  No acute hemorrhage mass or mass effect.    EEG report 4/19/2023:  EEG Summary:  Abnormal EEG in the awake, drowsy and asleep states.  1.	Abundant generalized spike & spike wave complexes (<1 sec) often fragmented maximal in the frontal midline   2.	Background slowing, generalized, mild to moderate       Impression/Clinical Correlate:  This is an abnormal EEG record.   1.	Generalized spike & spike-wave complexes support a generalized epilepsy diathesis  2.	Mild to moderate diffuse / multifocal cerebral dysfunction.   3.	No seizures were recorded.

## 2023-04-20 ENCOUNTER — TRANSCRIPTION ENCOUNTER (OUTPATIENT)
Age: 63
End: 2023-04-20

## 2023-04-20 VITALS
OXYGEN SATURATION: 100 % | SYSTOLIC BLOOD PRESSURE: 112 MMHG | DIASTOLIC BLOOD PRESSURE: 74 MMHG | RESPIRATION RATE: 18 BRPM | TEMPERATURE: 98 F | HEART RATE: 69 BPM

## 2023-04-20 PROCEDURE — 84146 ASSAY OF PROLACTIN: CPT

## 2023-04-20 PROCEDURE — G1004: CPT

## 2023-04-20 PROCEDURE — 99285 EMERGENCY DEPT VISIT HI MDM: CPT

## 2023-04-20 PROCEDURE — 70450 CT HEAD/BRAIN W/O DYE: CPT | Mod: MG

## 2023-04-20 PROCEDURE — 95700 EEG CONT REC W/VID EEG TECH: CPT

## 2023-04-20 PROCEDURE — 95713 VEEG 2-12 HR CONT MNTR: CPT

## 2023-04-20 PROCEDURE — 83605 ASSAY OF LACTIC ACID: CPT

## 2023-04-20 PROCEDURE — 85025 COMPLETE CBC W/AUTO DIFF WBC: CPT

## 2023-04-20 PROCEDURE — 95716 VEEG EA 12-26HR CONT MNTR: CPT

## 2023-04-20 PROCEDURE — 86803 HEPATITIS C AB TEST: CPT

## 2023-04-20 PROCEDURE — 99231 SBSQ HOSP IP/OBS SF/LOW 25: CPT

## 2023-04-20 PROCEDURE — 82550 ASSAY OF CK (CPK): CPT

## 2023-04-20 PROCEDURE — 80053 COMPREHEN METABOLIC PANEL: CPT

## 2023-04-20 PROCEDURE — 95718 EEG PHYS/QHP 2-12 HR W/VEEG: CPT

## 2023-04-20 RX ADMIN — LEVETIRACETAM 500 MILLIGRAM(S): 250 TABLET, FILM COATED ORAL at 06:28

## 2023-04-20 RX ADMIN — MEMANTINE HYDROCHLORIDE 28 MILLIGRAM(S): 10 TABLET ORAL at 11:38

## 2023-04-20 NOTE — PROGRESS NOTE ADULT - ASSESSMENT
Patient is a 62yo RT handed Rainer Speaking M with pmh of Dementia ( dx 2021, Follows Dr. Caren Garcia) and HLD presents to [facility] for episode of AMS.  Patient was sitting on a high chair eating breakfast when the event occurred. Patient fell backwards and had a headstrike. Patient had CT head w/o contrast on admission which was negative for any acute abnormalities.    Semiology   1. Ictal Cry with Left arm raise. Then  rt gaze deviation with rt head turn.  Followed by postictal confusion.     PLAN:   - vEEG  - loaded with keppra 1g IVPB on 4/19 and continued maintenance keppra 500mg BID  - Continue on Memantine 28mg Qday ER   - Continue on Donepezil 10mg Qday    - Plan of care discussed with lisa at bedside, patient, and patient's wife   Rescue: Ativan 1mg for GTCs with VS changes lasting longer than 3 minutes, If refractory then Vimpat 100mg X1 IV   Seizure precautions   Telemetry   DVT ppx - lovenox subq   diet: regular     Patient seen and discussed with Epilepsy Attending, Dr. Levi.

## 2023-04-20 NOTE — EEG REPORT - NS EEG TEXT BOX
Day 2 – 	Start: 4/19/2023 0800 AM   	End: 4/20/2023 08:00 AM   	Duration:  24 hr     Daily EEG Visual Analysis    FINDINGS:  The background was continuous, symmetric, spontaneously variable and reactive. During wakefulness, the posterior dominant rhythm consisted of symmetric, 7-8 Hz activity, with amplitude to 30 uV, that attenuated to eye opening.  Low amplitude frontal beta was noted in wakefulness. Anterior to posterior gradient was present.     Background Slowing:  Diffuse theta and delta activity was present with shifting maxima    Focal Slowing:   Intermittent polymorphic/quasiperiodic delta slowing in the left temporal region, seen during sleep state.     Sleep Background:  Drowsiness was characterized by fragmentation, attenuation, and slowing of the background activity.    Sleep was characterized by the presence of vertex waves, symmetric sleep spindles and K-complexes.    Other Non-Epileptiform Findings:  None were present.    Activation Procedures:   Hyperventilation was not performed.    Photic stimulation was not performed.    Interictal Epileptiform Activity:   Abundant generalized spike wave discharges often fragmented maximal in the frontal midline     Events:  No events or seizures recorded.    Artifacts:  Intermittent myogenic and movement artifacts were noted.    ECG:  The heart rate on single channel ECG was predominantly between 60-90 BPM.    ASMs: None     EEG Summary:    Abnormal EEG in the awake, drowsy and asleep states.    1.	Abundant generalized spike & spike wave complexes (<1 sec) often fragmented maximal in the frontal midline   2.	Background slowing, generalized, mild to moderate       Impression/Clinical Correlate:    This is an abnormal EEG record.     1.	Generalized spike & spike-wave complexes support a generalized epilepsy diathesis  2.	Mild to moderate diffuse / multifocal cerebral dysfunction.   3.	No seizures were recorded.     Day 2 – 	Start: 4/19/2023 0800 AM   	End: 4/20/2023 11:10 AM   	Duration:  24 hr     Daily EEG Visual Analysis    FINDINGS:  The background was continuous, symmetric, spontaneously variable and reactive. During wakefulness, the posterior dominant rhythm consisted of symmetric, 7-8 Hz activity, with amplitude to 30 uV, that attenuated to eye opening.  Low amplitude frontal beta was noted in wakefulness. Anterior to posterior gradient was present.     Background Slowing:  Diffuse theta and delta activity was present with shifting maxima    Focal Slowing:   Intermittent polymorphic/quasiperiodic delta slowing in the left temporal region, seen during sleep state.     Sleep Background:  Drowsiness was characterized by fragmentation, attenuation, and slowing of the background activity.    Sleep was characterized by the presence of vertex waves, symmetric sleep spindles and K-complexes.    Other Non-Epileptiform Findings:  None were present.    Activation Procedures:   Hyperventilation was not performed.    Photic stimulation was not performed.    Interictal Epileptiform Activity:   Abundant generalized spike wave discharges often fragmented maximal in the frontal midline     Events:  No events or seizures recorded.    Artifacts:  Intermittent myogenic and movement artifacts were noted.    ECG:  The heart rate on single channel ECG was predominantly between 60-90 BPM.    ASMs: None     EEG Summary:    Abnormal EEG in the awake, drowsy and asleep states.    1.	Abundant generalized spike & spike wave complexes (<1 sec) often fragmented maximal in the frontal midline   2.	Background slowing, generalized, mild to moderate       Impression/Clinical Correlate:    This is an abnormal EEG record.     1.	Generalized spike & spike-wave complexes support a generalized epilepsy diathesis  2.	Mild to moderate diffuse / multifocal cerebral dysfunction.   3.	No seizures were recorded.     Day 2 – 	Start: 4/19/2023 0800 AM   	End: 4/20/2023 11:10 AM   	Duration:  27 hr     Daily EEG Visual Analysis    FINDINGS:  The background was continuous, symmetric, spontaneously variable and reactive. During wakefulness, the posterior dominant rhythm consisted of symmetric, 7-8 Hz activity, with amplitude to 30 uV, that attenuated to eye opening.  Low amplitude frontal beta was noted in wakefulness. Anterior to posterior gradient was present.     Background Slowing:  Diffuse theta and delta activity was present with shifting maxima    Focal Slowing:   Intermittent polymorphic/quasiperiodic delta slowing in the left temporal region, seen during sleep state.     Sleep Background:  Drowsiness was characterized by fragmentation, attenuation, and slowing of the background activity.    Sleep was characterized by the presence of vertex waves, symmetric sleep spindles and K-complexes.    Other Non-Epileptiform Findings:  None were present.    Activation Procedures:   Hyperventilation was not performed.    Photic stimulation was not performed.    Interictal Epileptiform Activity:   Abundant generalized spike wave discharges often fragmented maximal in the frontal midline     Events:  No events or seizures recorded.    Artifacts:  Intermittent myogenic and movement artifacts were noted.    ECG:  The heart rate on single channel ECG was predominantly between 60-90 BPM.    ASMs: None     EEG Summary:    Abnormal EEG in the awake, drowsy and asleep states.    1.	Abundant generalized spike & spike wave complexes (<1 sec) often fragmented maximal in the frontal midline   2.	Background slowing, generalized, mild to moderate       Impression/Clinical Correlate:    This is an abnormal EEG record.     1.	Generalized spike & spike-wave complexes support a generalized epilepsy diathesis  2.	Mild to moderate diffuse / multifocal cerebral dysfunction.   3.	No seizures were recorded.     Day 2 – 	Start: 4/19/2023 0800 AM   	End: 4/20/2023 11:10 AM   	Duration:  27 hr     Daily EEG Visual Analysis    FINDINGS:  The background was continuous, symmetric, spontaneously variable and reactive. During wakefulness, the posterior dominant rhythm consisted of symmetric, 7-8 Hz activity, with amplitude to 30 uV, that attenuated to eye opening.  Low amplitude frontal beta was noted in wakefulness. Anterior to posterior gradient was present.     Background Slowing:  Diffuse theta and delta activity was present with shifting maxima    Focal Slowing:   Intermittent polymorphic/quasiperiodic delta slowing in the left temporal region, seen during sleep state.     Sleep Background:  Drowsiness was characterized by fragmentation, attenuation, and slowing of the background activity.    Sleep was characterized by the presence of vertex waves, symmetric sleep spindles and K-complexes.    Other Non-Epileptiform Findings:  None were present.    Activation Procedures:   Hyperventilation was not performed.    Photic stimulation was not performed.    Interictal Epileptiform Activity:   Abundant generalized spike wave discharges often fragmented maximal in the frontal midline     Events:  No events or seizures recorded.    Artifacts:  Intermittent myogenic and movement artifacts were noted.    ECG:  The heart rate on single channel ECG was predominantly between 60-90 BPM.    ASMs: None     EEG Summary:    Abnormal EEG in the awake, drowsy and asleep states.    1.	Abundant generalized spike & spike wave complexes (<1 sec) often fragmented maximal in the frontal midline   2.	Background slowing, generalized, mild to moderate       Impression/Clinical Correlate:    This is an abnormal EEG record.     1.	Generalized spike & spike-wave complexes indicate a risk of seizures with generalized onset.  2.	Mild to moderate diffuse / multifocal cerebral dysfunction.   3.	No seizures were recorded.    Rk Holder MD  Neurology Attending Physician

## 2023-04-20 NOTE — DISCHARGE NOTE NURSING/CASE MANAGEMENT/SOCIAL WORK - PATIENT PORTAL LINK FT
You can access the FollowMyHealth Patient Portal offered by [facility] by registering at the following website: [email or web address]. By joining [employer]’s FollowMyHealth portal, you will also be able to view your health information using other applications (apps) compatible with our system.

## 2023-04-20 NOTE — PROGRESS NOTE ADULT - SUBJECTIVE AND OBJECTIVE BOX
THE PATIENT WAS SEEN AND EXAMINED BY ME WITH THE HOUSESTAFF DURING MORNING ROUNDS.   HPI:  Patient is a 64yo RT handed Rainer Speaking M with pmh of Early onset Alzheimer's Dementia ( dx 2021, Follows Dr. Caren Garcia) and HLD presents to [facility] for episode of AMS. Patient's family member at bedside and provided history and video of the episode. Per family, this is the first time this has occurred. Patient was sitting on a high chair eating breakfast when the event occurred. Patient fell backwards and had a headstrike. Patient had CT head w/o contrast on admission which was negative for any acute abnormalities. Per video shown by family, patient had loud ictal cry with raising of his left arm followed by following backwards with Rt head turn with forced Rt gaze. Patient was noted to have foaming at the mouth. This event lasted for about 5 minutes followed by long period of postictal confusion. Family states at baseline, patient is AAOX3 and is currently AA0x2. Patient denies urinary and fecal incontinence.   Spoke to his Neurologist, Dr. Garcia whom confirmed patient does not have a history of epilepsy and diagnosed with Early onset Alzheimer's Dementia confirmed with MRI and PET scan.  Patient takes Memantine 28mg QDay ER and Donzepil 10mg Qday,   Semiology   1. Ictal Cry with Left arm raise. Then  rt gaze deviation with rt head turn.  Followed by postictal confusion.     ROS: Otherwise negative.     SUBJECTIVE: Overnight had pulled off EEG which was replaced. This morning stated that he is doing well and stated that he wanted to go home.     MEDICATIONS  (STANDING):  donepezil 10 milliGRAM(s) Oral at bedtime  enoxaparin Injectable 40 milliGRAM(s) SubCutaneous every 24 hours  levETIRAcetam 500 milliGRAM(s) Oral two times a day  memantine ER 28 milliGRAM(s) Oral daily    MEDICATIONS  (PRN):  LORazepam   Injectable 1 milliGRAM(s) IV Push once PRN Seizure Activity    Physical Exam: Neurological Exam:  	Mental Status: Orientated to self, date and place.  Attention intact.  No dysarthria, aphasia or neglect.  	Cranial Nerves: PERRL, EOMI, no nystagmus or diplopia. No facial asymmetry.    	Motor: moving all 4 extremities equally w 5/5 strength  	Tone: normal.                  	Pronator drift: none                 	Dysmetria: None to finger-nose-finger  	No truncal ataxia.    	Tremor: No resting, postural or action tremor.  No myoclonus.  	Sensation: intact to light touch    LABS:                        13.3   7.31  )-----------( 340      ( 19 Apr 2023 06:21 )             39.0    04-19    138  |  104  |  8   ----------------------------<  92  4.0   |  22  |  0.81    Ca    9.2      19 Apr 2023 06:21    TPro  6.5  /  Alb  4.1  /  TBili  0.3  /  DBili  x   /  AST  29  /  ALT  21  /  AlkPhos  92  04-19        IMAGING/ EEG:    CT Head No Cont (04.18.23  No acute hemorrhage mass or mass effect.    EEG report 4/19/2023:  EEG Summary:  Abnormal EEG in the awake, drowsy and asleep states.  1.	Abundant generalized spike & spike wave complexes (<1 sec) often fragmented maximal in the frontal midline   2.	Background slowing, generalized, mild to moderate       Impression/Clinical Correlate:  This is an abnormal EEG record.   1.	Generalized spike & spike-wave complexes support a generalized epilepsy diathesis  2.	Mild to moderate diffuse / multifocal cerebral dysfunction.   3.	No seizures were recorded.

## 2023-04-25 PROBLEM — E78.5 HYPERLIPIDEMIA, UNSPECIFIED: Chronic | Status: ACTIVE | Noted: 2023-04-18

## 2023-04-25 PROBLEM — F03.90 UNSPECIFIED DEMENTIA WITHOUT BEHAVIORAL DISTURBANCE: Chronic | Status: ACTIVE | Noted: 2023-04-18

## 2023-06-28 NOTE — ED PROVIDER NOTE - NS ED MD DISPO DIVISION
Detail Level: Detailed Add 85197 Cpt? (Important Note: In 2017 The Use Of 18457 Is Being Tracked By Cms To Determine Future Global Period Reimbursement For Global Periods): no [facility]

## 2023-07-05 ENCOUNTER — APPOINTMENT (OUTPATIENT)
Dept: NEUROLOGY | Facility: CLINIC | Age: 63
End: 2023-07-05

## 2024-04-04 PROBLEM — Z00.00 ENCOUNTER FOR PREVENTIVE HEALTH EXAMINATION: Status: ACTIVE | Noted: 2024-04-04
